# Patient Record
Sex: FEMALE | Race: WHITE | NOT HISPANIC OR LATINO | Employment: FULL TIME | URBAN - NONMETROPOLITAN AREA
[De-identification: names, ages, dates, MRNs, and addresses within clinical notes are randomized per-mention and may not be internally consistent; named-entity substitution may affect disease eponyms.]

---

## 2017-02-06 ENCOUNTER — OFFICE VISIT (OUTPATIENT)
Dept: RETAIL CLINIC | Facility: CLINIC | Age: 47
End: 2017-02-06

## 2017-02-06 VITALS
OXYGEN SATURATION: 97 % | SYSTOLIC BLOOD PRESSURE: 110 MMHG | DIASTOLIC BLOOD PRESSURE: 70 MMHG | RESPIRATION RATE: 16 BRPM | WEIGHT: 218 LBS | HEART RATE: 87 BPM | TEMPERATURE: 98.5 F

## 2017-02-06 DIAGNOSIS — J06.9 ACUTE URI: Primary | ICD-10-CM

## 2017-02-06 PROCEDURE — 99213 OFFICE O/P EST LOW 20 MIN: CPT | Performed by: NURSE PRACTITIONER

## 2017-02-06 RX ORDER — BROMPHENIRAMINE MALEATE, PSEUDOEPHEDRINE HYDROCHLORIDE, AND DEXTROMETHORPHAN HYDROBROMIDE 2; 30; 10 MG/5ML; MG/5ML; MG/5ML
10 SYRUP ORAL EVERY 6 HOURS PRN
Qty: 220 ML | Refills: 0 | Status: SHIPPED | OUTPATIENT
Start: 2017-02-06 | End: 2017-02-16

## 2017-02-06 RX ORDER — MEDROXYPROGESTERONE ACETATE 150 MG/ML
150 INJECTION, SUSPENSION INTRAMUSCULAR
COMMUNITY

## 2017-02-06 RX ORDER — ALBUTEROL SULFATE 90 UG/1
2 AEROSOL, METERED RESPIRATORY (INHALATION) EVERY 4 HOURS PRN
Qty: 1 INHALER | Refills: 0 | Status: SHIPPED | OUTPATIENT
Start: 2017-02-06 | End: 2017-05-10

## 2017-02-06 RX ORDER — PREDNISONE 10 MG/1
TABLET ORAL
Qty: 21 TABLET | Refills: 0 | Status: SHIPPED | OUTPATIENT
Start: 2017-02-06 | End: 2017-05-10

## 2017-02-06 NOTE — PATIENT INSTRUCTIONS
Antibiotics will not treat a viral illness, such as a cold or viral URI. Your symptoms should resolve over a course of 7-10 days with symptomatic treatment.   If no cough meds were prescribed today, you may Mucinex DM, over the counter, as per label instructions for cough. Tylenol or ibuprofen per directions for aches/fever;   Push fluids and rest; Gargle with warm salt water several times daily for throat irritation;   Vicks rub to chest may help relieve congestion; Avoid tobacco smoke.   Cool mist humidifier may also help relieve congestion;   Cover cough and use good handwashing, often, to help prevent spread of illness to close contacts;   F/U with PCP for persistent or worsening symptoms;       Only fill and take steroids if symptoms fail to improve after 48-72 hours with inhaler/cough meds.

## 2017-02-06 NOTE — PROGRESS NOTES
Ambika Saenz is a 46 y.o. female.   Chief Complaint   Patient presents with   • Cough     congestion, afebrile      Cough   This is a new problem. The current episode started in the past 7 days (2-3 days). The problem has been unchanged. The problem occurs every few minutes. The cough is productive of sputum (clear). Associated symptoms include headaches, postnasal drip, rhinorrhea and a sore throat. Pertinent negatives include no chest pain, chills, ear pain, fever, rash, shortness of breath or wheezing. The symptoms are aggravated by lying down. She has tried nothing for the symptoms. There is no history of asthma.        The following portions of the patient's history were reviewed and updated as appropriate: allergies, current medications, past family history, past medical history, past social history, past surgical history and problem list.    Current Outpatient Prescriptions:   •  medroxyPROGESTERone (DEPO-PROVERA) 150 MG/ML injection, Inject 150 mg into the shoulder, thigh, or buttocks Every 3 (Three) Months., Disp: , Rfl:   •  albuterol (PROVENTIL HFA;VENTOLIN HFA) 108 (90 BASE) MCG/ACT inhaler, Inhale 2 puffs Every 4 (Four) Hours As Needed for wheezing., Disp: 1 inhaler, Rfl: 0  •  brompheniramine-pseudoephedrine-DM 30-2-10 MG/5ML syrup, Take 10 mL by mouth Every 6 (Six) Hours As Needed for congestion, cough or allergies for up to 10 days., Disp: 220 mL, Rfl: 0  •  PredniSONE (DELTASONE) 10 MG (21) tablet pack, Use as directed on package per 6 day pack instructions, Disp: 21 tablet, Rfl: 0    Review of Systems   Constitutional: Negative for appetite change, chills, fatigue and fever.        No body aches   HENT: Positive for congestion, postnasal drip, rhinorrhea, sinus pressure, sneezing, sore throat and voice change. Negative for ear pain, facial swelling and mouth sores. Ear discharge: pressure.    Eyes: Negative for discharge.   Respiratory: Positive for cough. Negative for chest  tightness, shortness of breath and wheezing.    Cardiovascular: Negative for chest pain.   Gastrointestinal: Negative for abdominal pain, diarrhea, nausea and vomiting.   Skin: Negative for rash.   Neurological: Positive for headaches. Negative for dizziness and light-headedness.     Visit Vitals   • /70 (BP Location: Left arm, Patient Position: Sitting, Cuff Size: Large Adult)   • Pulse 87   • Temp 98.5 °F (36.9 °C) (Temporal Artery )   • Resp 16   • Wt 218 lb (98.9 kg)   • LMP Comment: Depo-Medrol   • SpO2 97%       Objective   No Known Allergies    Physical Exam   Constitutional: She is oriented to person, place, and time. She appears well-developed and well-nourished. She appears ill (mild). No distress.   HENT:   Head: Normocephalic and atraumatic.   Right Ear: External ear normal. Tympanic membrane is not erythematous. A middle ear effusion is present.   Left Ear: External ear normal. Tympanic membrane is not erythematous. A middle ear effusion is present.   Nose: Rhinorrhea present. Right sinus exhibits no maxillary sinus tenderness and no frontal sinus tenderness. Left sinus exhibits no maxillary sinus tenderness and no frontal sinus tenderness.   Mouth/Throat: Posterior oropharyngeal erythema (Mild with clear post nasal drip) present. No oropharyngeal exudate or posterior oropharyngeal edema.   Bilateral TM with mild serous effusion.    Significant nasal congestion.   Eyes: Conjunctivae, EOM and lids are normal.   Neck: Full passive range of motion without pain.   Cardiovascular: Normal rate and regular rhythm.    Pulmonary/Chest: Effort normal. No respiratory distress. She has wheezes (trace wheezes upper fields, no rhonchi).   Abdominal: Soft. Normal appearance and bowel sounds are normal. There is no tenderness.   Lymphadenopathy:        Head (right side): No tonsillar adenopathy present.        Head (left side): No tonsillar adenopathy present.     She has no cervical adenopathy.   Neurological:  She is alert and oriented to person, place, and time.   Skin: Skin is warm and dry. No rash noted.       Assessment/Plan   Rnad was seen today for cough.    Diagnoses and all orders for this visit:    Acute URI    Other orders  -     PredniSONE (DELTASONE) 10 MG (21) tablet pack; Use as directed on package per 6 day pack instructions  -     albuterol (PROVENTIL HFA;VENTOLIN HFA) 108 (90 BASE) MCG/ACT inhaler; Inhale 2 puffs Every 4 (Four) Hours As Needed for wheezing.  -     brompheniramine-pseudoephedrine-DM 30-2-10 MG/5ML syrup; Take 10 mL by mouth Every 6 (Six) Hours As Needed for congestion, cough or allergies for up to 10 days.    Only fill and take steroids if symptoms fail to improve after 48-72 hours with inhaler/cough meds

## 2017-03-06 ENCOUNTER — APPOINTMENT (OUTPATIENT)
Dept: WOMENS IMAGING | Facility: HOSPITAL | Age: 47
End: 2017-03-06

## 2017-03-06 PROCEDURE — 77063 BREAST TOMOSYNTHESIS BI: CPT | Performed by: RADIOLOGY

## 2017-03-06 PROCEDURE — 77067 SCR MAMMO BI INCL CAD: CPT | Performed by: RADIOLOGY

## 2017-05-10 ENCOUNTER — OFFICE VISIT (OUTPATIENT)
Dept: RETAIL CLINIC | Facility: CLINIC | Age: 47
End: 2017-05-10

## 2017-05-10 VITALS
SYSTOLIC BLOOD PRESSURE: 120 MMHG | DIASTOLIC BLOOD PRESSURE: 70 MMHG | HEART RATE: 77 BPM | OXYGEN SATURATION: 98 % | RESPIRATION RATE: 20 BRPM | TEMPERATURE: 99 F

## 2017-05-10 DIAGNOSIS — J40 BRONCHITIS: Primary | ICD-10-CM

## 2017-05-10 PROCEDURE — 99213 OFFICE O/P EST LOW 20 MIN: CPT | Performed by: NURSE PRACTITIONER

## 2017-05-10 RX ORDER — METHYLPREDNISOLONE 4 MG/1
TABLET ORAL
Qty: 21 TABLET | Refills: 0 | Status: SHIPPED | OUTPATIENT
Start: 2017-05-10

## 2017-05-10 RX ORDER — ALBUTEROL SULFATE 90 UG/1
2 AEROSOL, METERED RESPIRATORY (INHALATION) EVERY 6 HOURS PRN
Qty: 1 INHALER | Refills: 0 | Status: SHIPPED | OUTPATIENT
Start: 2017-05-10

## 2018-03-12 ENCOUNTER — APPOINTMENT (OUTPATIENT)
Dept: WOMENS IMAGING | Facility: HOSPITAL | Age: 48
End: 2018-03-12

## 2018-03-12 PROCEDURE — 77063 BREAST TOMOSYNTHESIS BI: CPT | Performed by: RADIOLOGY

## 2018-03-12 PROCEDURE — 77067 SCR MAMMO BI INCL CAD: CPT | Performed by: RADIOLOGY

## 2019-06-06 ENCOUNTER — APPOINTMENT (OUTPATIENT)
Dept: WOMENS IMAGING | Facility: HOSPITAL | Age: 49
End: 2019-06-06

## 2019-06-06 PROCEDURE — 77063 BREAST TOMOSYNTHESIS BI: CPT | Performed by: RADIOLOGY

## 2019-06-06 PROCEDURE — 77067 SCR MAMMO BI INCL CAD: CPT | Performed by: RADIOLOGY

## 2020-10-06 ENCOUNTER — APPOINTMENT (OUTPATIENT)
Dept: WOMENS IMAGING | Facility: HOSPITAL | Age: 50
End: 2020-10-06

## 2020-10-06 PROCEDURE — 77063 BREAST TOMOSYNTHESIS BI: CPT | Performed by: RADIOLOGY

## 2020-10-06 PROCEDURE — 77067 SCR MAMMO BI INCL CAD: CPT | Performed by: RADIOLOGY

## 2022-06-14 ENCOUNTER — APPOINTMENT (OUTPATIENT)
Dept: WOMENS IMAGING | Facility: HOSPITAL | Age: 52
End: 2022-06-14

## 2022-06-14 PROCEDURE — 77066 DX MAMMO INCL CAD BI: CPT | Performed by: RADIOLOGY

## 2022-06-14 PROCEDURE — 76641 ULTRASOUND BREAST COMPLETE: CPT | Performed by: RADIOLOGY

## 2022-06-14 PROCEDURE — 77062 BREAST TOMOSYNTHESIS BI: CPT | Performed by: RADIOLOGY
